# Patient Record
Sex: FEMALE | NOT HISPANIC OR LATINO | URBAN - METROPOLITAN AREA
[De-identification: names, ages, dates, MRNs, and addresses within clinical notes are randomized per-mention and may not be internally consistent; named-entity substitution may affect disease eponyms.]

---

## 2019-08-02 ENCOUNTER — APPOINTMENT (RX ONLY)
Dept: URBAN - METROPOLITAN AREA CLINIC 348 | Facility: CLINIC | Age: 69
Setting detail: DERMATOLOGY
End: 2019-08-02

## 2019-08-02 DIAGNOSIS — L30.9 DERMATITIS, UNSPECIFIED: ICD-10-CM

## 2019-08-02 PROCEDURE — ? OTHER

## 2019-08-02 PROCEDURE — ? COUNSELING

## 2019-08-02 PROCEDURE — 99202 OFFICE O/P NEW SF 15 MIN: CPT

## 2019-08-02 PROCEDURE — ? TREATMENT REGIMEN

## 2019-08-02 PROCEDURE — ? KOH PREP

## 2019-08-02 PROCEDURE — ? PRESCRIPTION

## 2019-08-02 RX ORDER — KETOCONAZOLE 20 MG/G
CREAM TOPICAL
Qty: 1 | Refills: 2 | Status: ERX

## 2019-08-02 RX ORDER — TRIAMCINOLONE ACETONIDE 1 MG/G
CREAM TOPICAL
Qty: 1 | Refills: 2 | Status: ERX

## 2019-08-02 ASSESSMENT — LOCATION SIMPLE DESCRIPTION DERM
LOCATION SIMPLE: RIGHT PRETIBIAL REGION
LOCATION SIMPLE: LEFT ANTERIOR NECK
LOCATION SIMPLE: RIGHT FOOT
LOCATION SIMPLE: LEFT FOREARM
LOCATION SIMPLE: RIGHT FOREARM
LOCATION SIMPLE: LEFT PRETIBIAL REGION

## 2019-08-02 ASSESSMENT — LOCATION ZONE DERM
LOCATION ZONE: LEG
LOCATION ZONE: FEET
LOCATION ZONE: NECK
LOCATION ZONE: ARM

## 2019-08-02 ASSESSMENT — LOCATION DETAILED DESCRIPTION DERM
LOCATION DETAILED: RIGHT DORSAL FOOT
LOCATION DETAILED: LEFT DISTAL PRETIBIAL REGION
LOCATION DETAILED: LEFT INFERIOR ANTERIOR NECK
LOCATION DETAILED: LEFT PROXIMAL DORSAL FOREARM
LOCATION DETAILED: RIGHT PROXIMAL DORSAL FOREARM
LOCATION DETAILED: RIGHT DISTAL PRETIBIAL REGION

## 2019-08-02 NOTE — PROCEDURE: OTHER
Other (Free Text): Scraping leg positive for tinea and hx same eruption on arms x yrs per Pt.  Hx Cutaneous lymphoma per Pt (records scanned).  Therefore rx keto for legs and TAC for arms
Detail Level: Detailed
Note Text (......Xxx Chief Complaint.): This diagnosis correlates with the

## 2019-08-02 NOTE — PROCEDURE: TREATMENT REGIMEN
Initiate Treatment: Ketoconazole- Apply twice daily to the legs and feet X 3 weeks , then as needed \\nTriamcinolone- apply once daily to the affected areas of the upper body X 2 weeks, then as needed
Detail Level: Zone

## 2019-08-23 ENCOUNTER — APPOINTMENT (RX ONLY)
Dept: URBAN - METROPOLITAN AREA CLINIC 348 | Facility: CLINIC | Age: 69
Setting detail: DERMATOLOGY
End: 2019-08-23

## 2019-08-23 DIAGNOSIS — L30.9 DERMATITIS, UNSPECIFIED: ICD-10-CM | Status: IMPROVED

## 2019-08-23 DIAGNOSIS — C84.0 MYCOSIS FUNGOIDES: ICD-10-CM

## 2019-08-23 PROBLEM — C84.00 MYCOSIS FUNGOIDES, UNSPECIFIED SITE: Status: ACTIVE | Noted: 2019-08-23

## 2019-08-23 PROCEDURE — ? COUNSELING

## 2019-08-23 PROCEDURE — ? OTHER

## 2019-08-23 PROCEDURE — 99213 OFFICE O/P EST LOW 20 MIN: CPT

## 2019-08-23 PROCEDURE — ? TREATMENT REGIMEN

## 2019-08-23 ASSESSMENT — LOCATION ZONE DERM
LOCATION ZONE: ARM
LOCATION ZONE: FACE
LOCATION ZONE: LEG

## 2019-08-23 ASSESSMENT — LOCATION SIMPLE DESCRIPTION DERM
LOCATION SIMPLE: LEFT CHEEK
LOCATION SIMPLE: LEFT PRETIBIAL REGION
LOCATION SIMPLE: RIGHT PRETIBIAL REGION
LOCATION SIMPLE: LEFT POSTERIOR UPPER ARM
LOCATION SIMPLE: RIGHT POSTERIOR UPPER ARM

## 2019-08-23 ASSESSMENT — LOCATION DETAILED DESCRIPTION DERM
LOCATION DETAILED: LEFT INFERIOR CENTRAL MALAR CHEEK
LOCATION DETAILED: LEFT DISTAL POSTERIOR UPPER ARM
LOCATION DETAILED: RIGHT PROXIMAL POSTERIOR UPPER ARM
LOCATION DETAILED: RIGHT DISTAL PRETIBIAL REGION
LOCATION DETAILED: LEFT DISTAL PRETIBIAL REGION

## 2019-08-23 NOTE — PROCEDURE: OTHER
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 Pt scheduled to see oncologist at [facility] soon

## 2019-08-23 NOTE — PROCEDURE: TREATMENT REGIMEN
Detail Level: Zone
Otc Regimen: Xyzal daily for itching
Plan: Follow up with oncology (Dr Perez)\\nPatient deferred use of topical steroid
Initiate Treatment: Ketoconazole cream twice daily (patient never started)
Plan: White vinegar soaks

## 2019-10-14 ENCOUNTER — RX ONLY (OUTPATIENT)
Age: 69
Setting detail: RX ONLY
End: 2019-10-14

## 2019-10-14 RX ORDER — KETOCONAZOLE 20 MG/G
CREAM TOPICAL
Qty: 1 | Refills: 2 | Status: ERX | COMMUNITY
Start: 2019-10-14

## 2019-10-14 RX ORDER — TRIAMCINOLONE ACETONIDE 1 MG/G
CREAM TOPICAL
Qty: 1 | Refills: 2 | Status: ERX | COMMUNITY
Start: 2019-10-14

## 2019-10-25 ENCOUNTER — IMPORTED ENCOUNTER (OUTPATIENT)
Dept: URBAN - METROPOLITAN AREA CLINIC 59 | Facility: CLINIC | Age: 69
End: 2019-10-25

## 2019-10-25 PROBLEM — H04.123 TEAR FILM INSUFFICIENCY OF BILATERAL LACRIMAL GLANDS: Noted: 2019-10-25

## 2019-10-25 PROBLEM — H25.13 BILATERAL NUCLEAR SCLEROSIS CATARACTS: Noted: 2019-10-25

## 2019-10-25 PROBLEM — H40.013 OPEN ANGLE WITH BORDERLINE FINDINGS, LOW RISK, BILATERAL: Noted: 2019-10-25

## 2019-10-25 PROCEDURE — 92133 CPTRZD OPH DX IMG PST SGM ON: CPT

## 2019-10-25 PROCEDURE — 92004 COMPRE OPH EXAM NEW PT 1/>: CPT

## 2020-06-24 ENCOUNTER — IMPORTED ENCOUNTER (OUTPATIENT)
Dept: URBAN - METROPOLITAN AREA CLINIC 59 | Facility: CLINIC | Age: 70
End: 2020-06-24

## 2020-06-24 PROBLEM — R42 VERTIGO NOS: Noted: 2020-06-24

## 2020-06-24 PROBLEM — H40.013 OPEN ANGLE WITH BORDERLINE FINDINGS, LOW RISK, BILATERAL: Noted: 2020-06-24

## 2020-06-24 PROBLEM — H25.13 BILATERAL NUCLEAR SCLEROSIS CATARACTS: Noted: 2020-06-24

## 2020-06-24 PROBLEM — M43.6 TORTICOLLIS: Noted: 2020-06-24

## 2020-06-24 PROCEDURE — 92012 INTRM OPH EXAM EST PATIENT: CPT

## 2021-10-06 ENCOUNTER — IMPORTED ENCOUNTER (OUTPATIENT)
Dept: URBAN - METROPOLITAN AREA CLINIC 59 | Facility: CLINIC | Age: 71
End: 2021-10-06

## 2021-10-06 PROBLEM — H25.13 NUCLEAR SCLEROSIS: Noted: 2021-10-06

## 2021-10-06 PROBLEM — H40.013 OPEN ANGLE WITH BORDERLINE FINDINGS, LOW RISK, BILATERAL: Noted: 2021-10-06

## 2021-10-06 PROBLEM — H25.13 BILATERAL NUCLEAR SCLEROSIS CATARACTS: Noted: 2021-10-06

## 2021-10-06 PROBLEM — H40.013 GLAUCOMA SUSPECT, LOW RISK: Noted: 2021-10-06

## 2021-10-06 PROCEDURE — 92250 FUNDUS PHOTOGRAPHY W/I&R: CPT

## 2021-10-06 PROCEDURE — 92014 COMPRE OPH EXAM EST PT 1/>: CPT

## 2021-10-06 PROCEDURE — 92015 DETERMINE REFRACTIVE STATE: CPT

## 2022-09-08 ENCOUNTER — ESTABLISHED COMPREHENSIVE EXAM (OUTPATIENT)
Dept: URBAN - METROPOLITAN AREA CLINIC 59 | Facility: CLINIC | Age: 72
End: 2022-09-08

## 2022-09-08 DIAGNOSIS — H25.13: ICD-10-CM

## 2022-09-08 DIAGNOSIS — H52.01: ICD-10-CM

## 2022-09-08 DIAGNOSIS — H04.123: ICD-10-CM

## 2022-09-08 DIAGNOSIS — H40.013: ICD-10-CM

## 2022-09-08 PROCEDURE — 92014 COMPRE OPH EXAM EST PT 1/>: CPT

## 2022-09-08 PROCEDURE — 92015 DETERMINE REFRACTIVE STATE: CPT | Mod: NC

## 2022-09-08 PROCEDURE — 92133 CPTRZD OPH DX IMG PST SGM ON: CPT

## 2022-09-08 ASSESSMENT — VISUAL ACUITY
OD_SC: 20/40+1
OD_PH: 20/25-2
OS_SC: 20/25-2

## 2022-09-08 ASSESSMENT — TONOMETRY
OS_IOP_MMHG: 17
OD_IOP_MMHG: 17

## 2023-09-21 ENCOUNTER — ESTABLISHED COMPREHENSIVE EXAM (OUTPATIENT)
Dept: URBAN - METROPOLITAN AREA CLINIC 59 | Facility: CLINIC | Age: 73
End: 2023-09-21

## 2023-09-21 DIAGNOSIS — H25.13: ICD-10-CM

## 2023-09-21 DIAGNOSIS — H04.123: ICD-10-CM

## 2023-09-21 DIAGNOSIS — H40.013: ICD-10-CM

## 2023-09-21 DIAGNOSIS — H00.11: ICD-10-CM

## 2023-09-21 PROCEDURE — 92014 COMPRE OPH EXAM EST PT 1/>: CPT

## 2023-09-21 PROCEDURE — 92133 CPTRZD OPH DX IMG PST SGM ON: CPT

## 2023-09-21 ASSESSMENT — VISUAL ACUITY
OS_CC: 20/20-1
OD_CC: 20/20-1

## 2023-09-21 ASSESSMENT — TONOMETRY
OS_IOP_MMHG: 21
OD_IOP_MMHG: 21

## 2023-10-21 ASSESSMENT — TONOMETRY
OS_IOP_MMHG: 16
OD_IOP_MMHG: 16
OD_IOP_MMHG: 16
OS_IOP_MMHG: 16
OS_IOP_MMHG: 15
OD_IOP_MMHG: 16

## 2023-10-21 ASSESSMENT — VISUAL ACUITY
OS_SC: 20/25
OD_SC: 20/30
OD_SC: 20/25-1
OD_SC: 20/25
OS_SC: 20/20
OS_BAT: 20/25
OD_BAT: 20/40

## 2024-09-19 ENCOUNTER — ESTABLISHED COMPREHENSIVE EXAM (OUTPATIENT)
Dept: URBAN - METROPOLITAN AREA CLINIC 59 | Facility: CLINIC | Age: 74
End: 2024-09-19

## 2024-09-19 DIAGNOSIS — H40.013: ICD-10-CM

## 2024-09-19 PROCEDURE — 92014 COMPRE OPH EXAM EST PT 1/>: CPT

## 2024-09-19 PROCEDURE — 92133 CPTRZD OPH DX IMG PST SGM ON: CPT

## 2024-09-19 ASSESSMENT — TONOMETRY
OS_IOP_MMHG: 21
OD_IOP_MMHG: 21

## 2024-09-19 ASSESSMENT — VISUAL ACUITY
OD_CC: 20/20-1
OS_CC: 20/20-1